# Patient Record
Sex: FEMALE | Race: WHITE | NOT HISPANIC OR LATINO | Employment: UNEMPLOYED | URBAN - METROPOLITAN AREA
[De-identification: names, ages, dates, MRNs, and addresses within clinical notes are randomized per-mention and may not be internally consistent; named-entity substitution may affect disease eponyms.]

---

## 2023-05-25 ENCOUNTER — OFFICE VISIT (OUTPATIENT)
Dept: PRIMARY CARE | Facility: CLINIC | Age: 23
End: 2023-05-25
Payer: COMMERCIAL

## 2023-05-25 ENCOUNTER — LAB (OUTPATIENT)
Dept: LAB | Facility: LAB | Age: 23
End: 2023-05-25
Payer: COMMERCIAL

## 2023-05-25 VITALS
BODY MASS INDEX: 21.12 KG/M2 | SYSTOLIC BLOOD PRESSURE: 123 MMHG | WEIGHT: 125 LBS | DIASTOLIC BLOOD PRESSURE: 86 MMHG | RESPIRATION RATE: 16 BRPM | TEMPERATURE: 98.2 F | HEART RATE: 82 BPM

## 2023-05-25 DIAGNOSIS — D50.9 IRON DEFICIENCY ANEMIA, UNSPECIFIED IRON DEFICIENCY ANEMIA TYPE: ICD-10-CM

## 2023-05-25 DIAGNOSIS — R53.83 OTHER FATIGUE: ICD-10-CM

## 2023-05-25 DIAGNOSIS — Z13.220 LIPID SCREENING: ICD-10-CM

## 2023-05-25 DIAGNOSIS — D50.9 IRON DEFICIENCY ANEMIA, UNSPECIFIED IRON DEFICIENCY ANEMIA TYPE: Primary | ICD-10-CM

## 2023-05-25 DIAGNOSIS — E55.9 VITAMIN D DEFICIENCY: ICD-10-CM

## 2023-05-25 DIAGNOSIS — Z30.09 BIRTH CONTROL COUNSELING: ICD-10-CM

## 2023-05-25 LAB
ALANINE AMINOTRANSFERASE (SGPT) (U/L) IN SER/PLAS: 12 U/L (ref 7–45)
ALBUMIN (G/DL) IN SER/PLAS: 4.1 G/DL (ref 3.4–5)
ALKALINE PHOSPHATASE (U/L) IN SER/PLAS: 68 U/L (ref 33–110)
ANION GAP IN SER/PLAS: 12 MMOL/L (ref 10–20)
ASPARTATE AMINOTRANSFERASE (SGOT) (U/L) IN SER/PLAS: 18 U/L (ref 9–39)
BASOPHILS (10*3/UL) IN BLOOD BY AUTOMATED COUNT: 0.04 X10E9/L (ref 0–0.1)
BASOPHILS/100 LEUKOCYTES IN BLOOD BY AUTOMATED COUNT: 0.7 % (ref 0–2)
BILIRUBIN TOTAL (MG/DL) IN SER/PLAS: 0.4 MG/DL (ref 0–1.2)
CALCIDIOL (25 OH VITAMIN D3) (NG/ML) IN SER/PLAS: 29 NG/ML
CALCIUM (MG/DL) IN SER/PLAS: 9.4 MG/DL (ref 8.6–10.3)
CARBON DIOXIDE, TOTAL (MMOL/L) IN SER/PLAS: 25 MMOL/L (ref 21–32)
CHLORIDE (MMOL/L) IN SER/PLAS: 103 MMOL/L (ref 98–107)
CHOLESTEROL (MG/DL) IN SER/PLAS: 233 MG/DL (ref 0–199)
CHOLESTEROL IN HDL (MG/DL) IN SER/PLAS: 77.5 MG/DL
CHOLESTEROL/HDL RATIO: 3
COBALAMIN (VITAMIN B12) (PG/ML) IN SER/PLAS: 211 PG/ML (ref 211–911)
CREATININE (MG/DL) IN SER/PLAS: 0.66 MG/DL (ref 0.5–1.05)
EOSINOPHILS (10*3/UL) IN BLOOD BY AUTOMATED COUNT: 0.07 X10E9/L (ref 0–0.7)
EOSINOPHILS/100 LEUKOCYTES IN BLOOD BY AUTOMATED COUNT: 1.2 % (ref 0–6)
ERYTHROCYTE DISTRIBUTION WIDTH (RATIO) BY AUTOMATED COUNT: 12.8 % (ref 11.5–14.5)
ERYTHROCYTE MEAN CORPUSCULAR HEMOGLOBIN CONCENTRATION (G/DL) BY AUTOMATED: 31.6 G/DL (ref 32–36)
ERYTHROCYTE MEAN CORPUSCULAR VOLUME (FL) BY AUTOMATED COUNT: 92 FL (ref 80–100)
ERYTHROCYTES (10*6/UL) IN BLOOD BY AUTOMATED COUNT: 4.48 X10E12/L (ref 4–5.2)
FOLATE (NG/ML) IN SER/PLAS: 11.5 NG/ML
GFR FEMALE: >90 ML/MIN/1.73M2
GLUCOSE (MG/DL) IN SER/PLAS: 107 MG/DL (ref 74–99)
HEMATOCRIT (%) IN BLOOD BY AUTOMATED COUNT: 41.2 % (ref 36–46)
HEMOGLOBIN (G/DL) IN BLOOD: 13 G/DL (ref 12–16)
HEMOGLOBIN (PG) IN RETICULOCYTES: 34 PG (ref 28–38)
IMMATURE GRANULOCYTES/100 LEUKOCYTES IN BLOOD BY AUTOMATED COUNT: 0.5 % (ref 0–0.9)
IRON (UG/DL) IN SER/PLAS: 95 UG/DL (ref 35–150)
IRON BINDING CAPACITY (UG/DL) IN SER/PLAS: 416 UG/DL (ref 240–445)
IRON SATURATION (%) IN SER/PLAS: 23 % (ref 25–45)
LDL: 125 MG/DL (ref 0–119)
LEUKOCYTES (10*3/UL) IN BLOOD BY AUTOMATED COUNT: 5.9 X10E9/L (ref 4.4–11.3)
LYMPHOCYTES (10*3/UL) IN BLOOD BY AUTOMATED COUNT: 1.36 X10E9/L (ref 1.2–4.8)
LYMPHOCYTES/100 LEUKOCYTES IN BLOOD BY AUTOMATED COUNT: 23.2 % (ref 13–44)
MONOCYTES (10*3/UL) IN BLOOD BY AUTOMATED COUNT: 0.42 X10E9/L (ref 0.1–1)
MONOCYTES/100 LEUKOCYTES IN BLOOD BY AUTOMATED COUNT: 7.2 % (ref 2–10)
NEUTROPHILS (10*3/UL) IN BLOOD BY AUTOMATED COUNT: 3.94 X10E9/L (ref 1.2–7.7)
NEUTROPHILS/100 LEUKOCYTES IN BLOOD BY AUTOMATED COUNT: 67.2 % (ref 40–80)
NON HDL CHOLESTEROL: 156 MG/DL (ref 0–149)
PLATELETS (10*3/UL) IN BLOOD AUTOMATED COUNT: 350 X10E9/L (ref 150–450)
POTASSIUM (MMOL/L) IN SER/PLAS: 4.4 MMOL/L (ref 3.5–5.3)
PROTEIN TOTAL: 7.4 G/DL (ref 6.4–8.2)
RETICULOCYTES (10*3/UL) IN BLOOD: 0.06 X10E12/L (ref 0.02–0.08)
RETICULOCYTES/100 ERYTHROCYTES IN BLOOD BY AUTOMATED COUNT: 1.3 % (ref 0.5–2)
SODIUM (MMOL/L) IN SER/PLAS: 136 MMOL/L (ref 136–145)
THYROTROPIN (MIU/L) IN SER/PLAS BY DETECTION LIMIT <= 0.05 MIU/L: 1.22 MIU/L (ref 0.44–3.98)
TRIGLYCERIDE (MG/DL) IN SER/PLAS: 152 MG/DL (ref 0–149)
UREA NITROGEN (MG/DL) IN SER/PLAS: 8 MG/DL (ref 6–23)
VLDL: 30 MG/DL (ref 0–40)

## 2023-05-25 PROCEDURE — 36415 COLL VENOUS BLD VENIPUNCTURE: CPT

## 2023-05-25 PROCEDURE — 84443 ASSAY THYROID STIM HORMONE: CPT

## 2023-05-25 PROCEDURE — 85025 COMPLETE CBC W/AUTO DIFF WBC: CPT

## 2023-05-25 PROCEDURE — 82607 VITAMIN B-12: CPT

## 2023-05-25 PROCEDURE — 80061 LIPID PANEL: CPT

## 2023-05-25 PROCEDURE — 80053 COMPREHEN METABOLIC PANEL: CPT

## 2023-05-25 PROCEDURE — 85045 AUTOMATED RETICULOCYTE COUNT: CPT

## 2023-05-25 PROCEDURE — 82306 VITAMIN D 25 HYDROXY: CPT

## 2023-05-25 PROCEDURE — 83550 IRON BINDING TEST: CPT

## 2023-05-25 PROCEDURE — 82746 ASSAY OF FOLIC ACID SERUM: CPT

## 2023-05-25 PROCEDURE — 83540 ASSAY OF IRON: CPT

## 2023-05-25 PROCEDURE — 99203 OFFICE O/P NEW LOW 30 MIN: CPT | Performed by: NURSE PRACTITIONER

## 2023-05-25 PROCEDURE — 82728 ASSAY OF FERRITIN: CPT

## 2023-05-25 RX ORDER — LEVONORGESTREL/ETHIN.ESTRADIOL 0.1-0.02MG
1 TABLET ORAL DAILY
COMMUNITY
Start: 2022-03-29 | End: 2023-12-12 | Stop reason: SDUPTHER

## 2023-05-25 NOTE — PROGRESS NOTES
Subjective   Patient ID: Soraya Quezada is a 22 y.o. female who presents for Iron Deficiency.    Pt here to establish care and discuss iron deficiency     She just graduated from  in middle childhood education (4th-9th grade math and science). Previously saw Dr. Espino for pediatrics. Eats chicken and turkey in her diet, fruits and vegetables. She was on a womens multivitamin in the past and still feels tired all the time. Sometimes feels unrefreshed after sleeping 8+ hours. She was napping daily.    She denies chest pain, shortness of breath, headaches,       Review of Systems   Constitutional:  Positive for fatigue. Negative for chills and fever.   HENT:  Negative for congestion, ear pain, rhinorrhea, sinus pressure and sore throat.    Eyes:  Negative for pain, discharge and itching.   Respiratory:  Negative for cough, shortness of breath and wheezing.    Cardiovascular:  Negative for chest pain and palpitations.   Gastrointestinal:  Negative for constipation, diarrhea, nausea and vomiting.   Genitourinary:  Negative for difficulty urinating and dysuria.   Musculoskeletal:  Negative for back pain, joint swelling and myalgias.   Skin:  Negative for color change.   Neurological:  Negative for headaches.   Hematological:  Negative for adenopathy.   Psychiatric/Behavioral:  Negative for decreased concentration. The patient is not nervous/anxious.        Objective   /86   Pulse 82   Temp 36.8 °C (98.2 °F)   Resp 16   Wt 56.7 kg (125 lb)   BMI 21.12 kg/m²     Physical Exam  Constitutional:       General: She is not in acute distress.     Appearance: She is not ill-appearing.   HENT:      Head: Normocephalic and atraumatic.      Mouth/Throat:      Mouth: Mucous membranes are moist.      Pharynx: Oropharynx is clear.   Eyes:      Conjunctiva/sclera: Conjunctivae normal.      Pupils: Pupils are equal, round, and reactive to light.   Cardiovascular:      Rate and Rhythm: Normal rate and regular rhythm.       Pulses: Normal pulses.      Heart sounds: Normal heart sounds.   Pulmonary:      Effort: Pulmonary effort is normal. No respiratory distress.      Breath sounds: Normal breath sounds.   Abdominal:      General: Bowel sounds are normal.      Palpations: Abdomen is soft.      Tenderness: There is no abdominal tenderness.   Musculoskeletal:         General: Normal range of motion.   Skin:     General: Skin is warm and dry.   Neurological:      General: No focal deficit present.      Mental Status: She is alert and oriented to person, place, and time.   Psychiatric:         Mood and Affect: Mood normal.         Behavior: Behavior normal.         Thought Content: Thought content normal.         Judgment: Judgment normal.         Assessment/Plan   Problem List Items Addressed This Visit    None  Visit Diagnoses       Iron deficiency anemia, unspecified iron deficiency anemia type    -  Primary    Relevant Orders    Folate (Completed)    Reticulocytes (Completed)    Ferritin (Completed)    Iron and TIBC (Completed)    CBC and Auto Differential (Completed)    Vitamin B12 (Completed)    Birth control counseling        Lipid screening        Relevant Orders    Lipid Panel (Completed)    Vitamin D deficiency        Relevant Orders    Vitamin D, Total (Completed)    Other fatigue        Relevant Orders    TSH with reflex to Free T4 if abnormal (Completed)    CBC and Auto Differential    Comprehensive Metabolic Panel (Completed)          Patient Instructions   Patient to continue meds as ordered. Have fasting labs drawn and we will call with results when available. Follow-up in 3 months for physical, or sooner if needed. Call the office if any problems or concerns in the meantime.

## 2023-05-26 LAB — FERRITIN (UG/LL) IN SER/PLAS: 37 UG/L (ref 8–150)

## 2023-06-07 RX ORDER — ERGOCALCIFEROL 1.25 MG/1
50000 CAPSULE ORAL
Qty: 12 CAPSULE | Refills: 0 | Status: SHIPPED | OUTPATIENT
Start: 2023-06-07 | End: 2023-08-30

## 2023-06-21 ASSESSMENT — ENCOUNTER SYMPTOMS
PALPITATIONS: 0
NERVOUS/ANXIOUS: 0
JOINT SWELLING: 0
HEADACHES: 0
RHINORRHEA: 0
SORE THROAT: 0
DIFFICULTY URINATING: 0
COLOR CHANGE: 0
NAUSEA: 0
SHORTNESS OF BREATH: 0
CHILLS: 0
SINUS PRESSURE: 0
DECREASED CONCENTRATION: 0
DYSURIA: 0
VOMITING: 0
MYALGIAS: 0
WHEEZING: 0
EYE PAIN: 0
FEVER: 0
EYE ITCHING: 0
DIARRHEA: 0
EYE DISCHARGE: 0
ADENOPATHY: 0
COUGH: 0
FATIGUE: 1
CONSTIPATION: 0
BACK PAIN: 0

## 2023-06-21 NOTE — PATIENT INSTRUCTIONS
Patient to continue meds as ordered. Have fasting labs drawn and we will call with results when available. Follow-up in 3 months for physical, or sooner if needed. Call the office if any problems or concerns in the meantime.

## 2023-09-14 ENCOUNTER — LAB (OUTPATIENT)
Dept: LAB | Facility: LAB | Age: 23
End: 2023-09-14
Payer: COMMERCIAL

## 2023-09-14 DIAGNOSIS — Z13.220 LIPID SCREENING: ICD-10-CM

## 2023-09-14 DIAGNOSIS — E55.9 VITAMIN D DEFICIENCY: ICD-10-CM

## 2023-09-14 LAB
CALCIDIOL (25 OH VITAMIN D3) (NG/ML) IN SER/PLAS: 47 NG/ML
CHOLESTEROL (MG/DL) IN SER/PLAS: 198 MG/DL (ref 0–199)
CHOLESTEROL IN HDL (MG/DL) IN SER/PLAS: 65.4 MG/DL
CHOLESTEROL/HDL RATIO: 3
LDL: 114 MG/DL (ref 0–119)
NON HDL CHOLESTEROL: 133 MG/DL (ref 0–149)
TRIGLYCERIDE (MG/DL) IN SER/PLAS: 94 MG/DL (ref 0–149)
VLDL: 19 MG/DL (ref 0–40)

## 2023-09-14 PROCEDURE — 36415 COLL VENOUS BLD VENIPUNCTURE: CPT

## 2023-09-14 PROCEDURE — 82306 VITAMIN D 25 HYDROXY: CPT

## 2023-09-14 PROCEDURE — 80061 LIPID PANEL: CPT

## 2023-12-12 ENCOUNTER — OFFICE VISIT (OUTPATIENT)
Dept: OBSTETRICS AND GYNECOLOGY | Facility: CLINIC | Age: 23
End: 2023-12-12
Payer: COMMERCIAL

## 2023-12-12 VITALS
HEIGHT: 65 IN | DIASTOLIC BLOOD PRESSURE: 74 MMHG | SYSTOLIC BLOOD PRESSURE: 118 MMHG | BODY MASS INDEX: 22.49 KG/M2 | WEIGHT: 135 LBS

## 2023-12-12 DIAGNOSIS — Z12.4 PAP SMEAR FOR CERVICAL CANCER SCREENING: ICD-10-CM

## 2023-12-12 DIAGNOSIS — Z11.3 ROUTINE SCREENING FOR STI (SEXUALLY TRANSMITTED INFECTION): ICD-10-CM

## 2023-12-12 DIAGNOSIS — Z30.41 ENCOUNTER FOR SURVEILLANCE OF CONTRACEPTIVE PILLS: Primary | ICD-10-CM

## 2023-12-12 PROBLEM — Z01.419 WELL WOMAN EXAM: Status: ACTIVE | Noted: 2023-12-12

## 2023-12-12 PROCEDURE — 88141 CYTOPATH C/V INTERPRET: CPT | Performed by: STUDENT IN AN ORGANIZED HEALTH CARE EDUCATION/TRAINING PROGRAM

## 2023-12-12 PROCEDURE — 87661 TRICHOMONAS VAGINALIS AMPLIF: CPT

## 2023-12-12 PROCEDURE — 88175 CYTOPATH C/V AUTO FLUID REDO: CPT

## 2023-12-12 PROCEDURE — 87800 DETECT AGNT MULT DNA DIREC: CPT

## 2023-12-12 PROCEDURE — 99395 PREV VISIT EST AGE 18-39: CPT | Performed by: ADVANCED PRACTICE MIDWIFE

## 2023-12-12 RX ORDER — LEVONORGESTREL/ETHIN.ESTRADIOL 0.1-0.02MG
1 TABLET ORAL DAILY
Qty: 90 TABLET | Refills: 3 | Status: SHIPPED | OUTPATIENT
Start: 2023-12-12 | End: 2024-01-24 | Stop reason: WASHOUT

## 2023-12-12 NOTE — PROGRESS NOTES
"Assessment/Plan   Problem List Items Addressed This Visit    None  Visit Diagnoses         Codes    Encounter for surveillance of contraceptive pills    -  Primary Z30.41    Relevant Medications    levonorgestreL-ethinyl estrad (Larissia) 0.1-20 mg-mcg tablet    Pap smear for cervical cancer screening     Z12.4    Relevant Orders    THINPREP PAP    Routine screening for STI (sexually transmitted infection)     Z11.3    Relevant Orders    THINPREP PAP            Yojana MARIANELA Coelho, ORLY-FAUSTINO     Subjective   Soraya Quezada is a 23 y.o. female who is here for a routine exam.     Due for first pap     Received HPV vaccination     Lives with: parents and brother   Current employment:  @ [employer]   T/E/D: never/rare ETOH/never   Up to date vision/dental: yes  PCP: yes  Menstrual history: OCPs, bleeds q 3 months   Sexual history: new partner since August  Yes condom use   Pregnancy history:  G/P 0  T: P:  EAB:   Ectopic:   SAB:   L:      Diet: yes   Exercise: sometimes    PMH, PSH, and Family hx reviewed and updated    Current contraception: OCP (estrogen/progesterone)  Refill Y/N:    Last pap: never  History of abnormal Pap smear: no  Family history of breast, uterine,  ovarian cancer: no  Regular self breast exam: not applicable  Last mammogram: n/a  History of abnormal mammogram: no          Review of Systems    Objective   /74   Ht 1.638 m (5' 4.5\")   Wt 61.2 kg (135 lb)   LMP 11/07/2023   BMI 22.81 kg/m²     Physical Exam  Constitutional:       Appearance: Normal appearance.   HENT:      Head: Normocephalic.   Neck:      Thyroid: No thyroid mass, thyromegaly or thyroid tenderness.   Cardiovascular:      Rate and Rhythm: Normal rate and regular rhythm.   Pulmonary:      Effort: Pulmonary effort is normal.      Breath sounds: Normal breath sounds.   Chest:   Breasts:     Right: Normal. No mass, nipple discharge or tenderness.      Left: Normal. No mass, nipple discharge or tenderness. "   Abdominal:      Palpations: Abdomen is soft.   Genitourinary:     Vagina: Normal.      Cervix: Normal.   Musculoskeletal:         General: Normal range of motion.   Lymphadenopathy:      Upper Body:      Right upper body: No axillary adenopathy.      Left upper body: No axillary adenopathy.   Skin:     General: Skin is warm and dry.   Neurological:      Mental Status: She is alert and oriented to person, place, and time.   Psychiatric:         Mood and Affect: Mood normal.          +Piecemeal deglutition

## 2023-12-14 LAB
C TRACH RRNA SPEC QL NAA+PROBE: NEGATIVE
N GONORRHOEA DNA SPEC QL PROBE+SIG AMP: NEGATIVE
T VAGINALIS RRNA SPEC QL NAA+PROBE: NEGATIVE

## 2024-01-03 LAB
CYTOLOGY CMNT CVX/VAG CYTO-IMP: NORMAL
LAB AP HPV HR: NORMAL
LAB AP PAP ADDITIONAL TESTS: NORMAL
LABORATORY COMMENT REPORT: NORMAL
LMP START DATE: NORMAL
PATH REPORT.TOTAL CANCER: NORMAL

## 2024-01-24 ENCOUNTER — OFFICE VISIT (OUTPATIENT)
Dept: OBSTETRICS AND GYNECOLOGY | Facility: CLINIC | Age: 24
End: 2024-01-24
Payer: COMMERCIAL

## 2024-01-24 ENCOUNTER — LAB (OUTPATIENT)
Dept: LAB | Facility: LAB | Age: 24
End: 2024-01-24
Payer: COMMERCIAL

## 2024-01-24 ENCOUNTER — TELEPHONE (OUTPATIENT)
Dept: OBSTETRICS AND GYNECOLOGY | Facility: CLINIC | Age: 24
End: 2024-01-24

## 2024-01-24 VITALS
HEIGHT: 64 IN | BODY MASS INDEX: 23.05 KG/M2 | DIASTOLIC BLOOD PRESSURE: 70 MMHG | WEIGHT: 135 LBS | SYSTOLIC BLOOD PRESSURE: 128 MMHG

## 2024-01-24 DIAGNOSIS — N93.9 ABNORMAL UTERINE BLEEDING: ICD-10-CM

## 2024-01-24 DIAGNOSIS — N93.9 ABNORMAL UTERINE BLEEDING: Primary | ICD-10-CM

## 2024-01-24 DIAGNOSIS — N92.1 BREAKTHROUGH BLEEDING ON OCPS: ICD-10-CM

## 2024-01-24 LAB
ERYTHROCYTE [DISTWIDTH] IN BLOOD BY AUTOMATED COUNT: 18.5 % (ref 11.5–14.5)
FERRITIN SERPL-MCNC: 9 NG/ML (ref 8–150)
HCT VFR BLD AUTO: 31.1 % (ref 36–46)
HGB BLD-MCNC: 9.1 G/DL (ref 12–16)
MCH RBC QN AUTO: 21.8 PG (ref 26–34)
MCHC RBC AUTO-ENTMCNC: 29.3 G/DL (ref 32–36)
MCV RBC AUTO: 74 FL (ref 80–100)
NRBC BLD-RTO: 0 /100 WBCS (ref 0–0)
PLATELET # BLD AUTO: 315 X10*3/UL (ref 150–450)
PREGNANCY TEST URINE, POC: NEGATIVE
RBC # BLD AUTO: 4.18 X10*6/UL (ref 4–5.2)
WBC # BLD AUTO: 5.6 X10*3/UL (ref 4.4–11.3)

## 2024-01-24 PROCEDURE — 81025 URINE PREGNANCY TEST: CPT | Performed by: ADVANCED PRACTICE MIDWIFE

## 2024-01-24 PROCEDURE — 99213 OFFICE O/P EST LOW 20 MIN: CPT | Performed by: ADVANCED PRACTICE MIDWIFE

## 2024-01-24 PROCEDURE — 82728 ASSAY OF FERRITIN: CPT

## 2024-01-24 PROCEDURE — 85027 COMPLETE CBC AUTOMATED: CPT

## 2024-01-24 PROCEDURE — 36415 COLL VENOUS BLD VENIPUNCTURE: CPT

## 2024-01-24 RX ORDER — DESOGESTREL AND ETHINYL ESTRADIOL 0.15-0.03
1 KIT ORAL DAILY
COMMUNITY
End: 2024-03-04

## 2024-01-24 RX ORDER — FERROUS SULFATE 325(65) MG
65 TABLET, DELAYED RELEASE (ENTERIC COATED) ORAL
COMMUNITY

## 2024-01-24 RX ORDER — NORGESTIMATE AND ETHINYL ESTRADIOL 0.25-0.035
1 KIT ORAL DAILY
Qty: 28 TABLET | Refills: 11 | Status: SHIPPED | OUTPATIENT
Start: 2024-01-24 | End: 2025-01-23

## 2024-01-24 ASSESSMENT — PAIN SCALES - GENERAL: PAINLEVEL: 0-NO PAIN

## 2024-01-24 NOTE — PROGRESS NOTES
Subjective   Patient ID: Soraya Quezada is a 23 y.o. female who presents for Vaginal Bleeding.  Vaginal Bleeding        Pt. Here to discuss AUB on OCPs    Has been continuously cycling and has a bleed q 3 months    Stopped OCP end of December x 7 days  Restarted active pills first week of January    Then started to have spotting about 4-5 days into active pill   And that continued to a lighter flow/regular flow     Stopped active pills again this past Monday and bleeding has been heavier this week   Yesterday bled through a tampon and a pad within the hour   Bleeding is heavy today   Worried about her iron levels    Would like to stay on OCP     Review of Systems   Genitourinary:  Positive for vaginal bleeding.       Objective   Physical Exam  Constitutional:       Appearance: Normal appearance.   Neurological:      Mental Status: She is alert.   Psychiatric:         Mood and Affect: Mood normal.         Behavior: Behavior normal.         Thought Content: Thought content normal.         Judgment: Judgment normal.         Assessment/Plan   Problem List Items Addressed This Visit             ICD-10-CM    Abnormal uterine bleeding - Primary N93.9    Relevant Medications    norgestimate-ethinyl estradioL (Ortho-Cyclen) 0.25-35 mg-mcg tablet    Other Relevant Orders    CBC    Ferritin    POCT pregnancy, urine (Completed)    Breakthrough bleeding on OCPs N92.1     RX sent for Ortho Cyclen. Restart active pills. If bleeding is not improved within the week to call office.                  TONI Mejía 01/24/24 11:05 AM

## 2024-01-24 NOTE — ASSESSMENT & PLAN NOTE
RX sent for Ortho Cyclen. Restart active pills. If bleeding is not improved within the week to call office.

## 2024-01-24 NOTE — TELEPHONE ENCOUNTER
Pt was seen 12/12/23 for LENO. She takes OCPs continuously and gets a menses every 3 months. This month, she took placebos as scheduled and had a period 12/28-1/4/24. She said that she stopped bleeding for 4-5d then restarted. She continued her pills for a few days but them stopped them since she was still bleeding/spotting. Pt then restarted pills and has cont to have bleeding, heavy at times saturating a super tampon and a pad multiple times a day for the last few days. She said that she has called off work due to it. Advised pt that stopping/ re-starting the pills off schedule and mid pack throws off the hormonal balance and will cause this AUB. Denies chance of pregnancy. She was advised to continue OCP's daily, keep track of sx and follow up in office to  further discuss with a provider. Transferred to the  to schedule.    states that call was disconnected during transfer and pt did not answer on call back. FD will try pt back one more time.

## 2024-01-26 ENCOUNTER — TELEPHONE (OUTPATIENT)
Dept: OBSTETRICS AND GYNECOLOGY | Facility: CLINIC | Age: 24
End: 2024-01-26
Payer: COMMERCIAL

## 2024-01-26 NOTE — TELEPHONE ENCOUNTER
Attempted to call pt to further discuss her sx and results.  Male answered.   Was told pt not available at the moment. Asked him to have pt call the office when can.

## 2024-01-26 NOTE — TELEPHONE ENCOUNTER
----- Message from Yojana Coelho APRN-CNM sent at 1/26/2024  8:38 AM EST -----  Can we call to check today? I sent a my chart message yesterday about her anemia, and how to take the pills if she was still bleeding.

## 2024-02-05 ENCOUNTER — TELEPHONE (OUTPATIENT)
Dept: OBSTETRICS AND GYNECOLOGY | Facility: CLINIC | Age: 24
End: 2024-02-05
Payer: COMMERCIAL

## 2024-02-05 NOTE — TELEPHONE ENCOUNTER
"Pt was seen 1/24/24. She was given an Rx for OCP's to take traditionally. Per chart notes, pt CBC came back low on 1/25/24 and she was instructed by provider to start pills with a tapered dose.  3 Pills day 1, 2 pills day two, then one pill daily. Rx was not updated to reflect those directions. Pharmacy states, it is to late to update the Rx. Pharmacist said, out of pocket cost for pt will be $73 or she can contact her insurance to see if they will do a one time override for the extra pill pack that is needed to keep pt on track.     2/6/24 1000 Nurse called pt ins. And was advised to contact Petra @ [phone]. Call had a long wait time and connection was lost after 19min. Will try back at a later time.   2/6/24 1430 Called petra back. Spoke to per who states pt ID# is [id number]. They transferred call to Simon, members line, advised to select \"member\" then inform rep  Office is calling.  Rep stated that override could be placed once pharmacy runs the order. (Call ref#[id number]) Office called pt [facility] pharmacy [phone] and spoke to pharmacist. She states that order has been run and sent to ins for override. Pt was informed and will contact pharmacy to  pill pack.   "

## 2024-03-03 DIAGNOSIS — Z30.9 ENCOUNTER FOR CONTRACEPTIVE MANAGEMENT, UNSPECIFIED: ICD-10-CM

## 2024-03-03 DIAGNOSIS — N93.9 ABNORMAL UTERINE BLEEDING (AUB): Primary | ICD-10-CM

## 2024-03-04 RX ORDER — DESOGESTREL AND ETHINYL ESTRADIOL 0.15-0.03
KIT ORAL
Qty: 112 TABLET | Refills: 2 | Status: SHIPPED | OUTPATIENT
Start: 2024-03-04 | End: 2024-04-17 | Stop reason: WASHOUT

## 2024-04-17 ENCOUNTER — TELEMEDICINE (OUTPATIENT)
Dept: OBSTETRICS AND GYNECOLOGY | Facility: CLINIC | Age: 24
End: 2024-04-17
Payer: COMMERCIAL

## 2024-04-17 DIAGNOSIS — N92.1 BREAKTHROUGH BLEEDING ON OCPS: Primary | ICD-10-CM

## 2024-04-17 PROCEDURE — 99212 OFFICE O/P EST SF 10 MIN: CPT | Performed by: ADVANCED PRACTICE MIDWIFE

## 2024-04-17 NOTE — ASSESSMENT & PLAN NOTE
Discussed having withdrawal bleed on this current OCP to see if this regulates. If not, will refer for second opinion but pt. Is also considering Depo if no relief.   Will check back in a few months

## 2024-04-17 NOTE — PROGRESS NOTES
Subjective   Patient ID: Soraya Quezada is a 23 y.o. female who presents for No chief complaint on file..  HPI    Pt. To discuss AUB on OCPs     Started on OCPs in 2021  Was on Loloestrin x 4 months, then had BTB and switched to Apri    Did continuous cycling on Apri for 2-3 years and did well. Had episode of acute menstrual bleeding in January and became anemic. Went on OCP taper.   Switched to Ortho Cyclen. Trying to continuous cycle again. Was ok for about 1.5 months and then had spotting on active pills again.   Not heavy at all, but frustrating.          Review of Systems    Objective   Physical Exam    Assessment/Plan   Problem List Items Addressed This Visit             ICD-10-CM       Medium    Breakthrough bleeding on OCPs - Primary N92.1     Discussed having withdrawal bleed on this current OCP to see if this regulates. If not, will refer for second opinion but pt. Is also considering Depo if no relief.   Will check back in a few months                  TONI Mejía 04/17/24 3:00 PM

## 2024-09-26 DIAGNOSIS — N93.9 ABNORMAL UTERINE BLEEDING: ICD-10-CM

## 2024-09-26 RX ORDER — NORGESTIMATE AND ETHINYL ESTRADIOL 0.25-0.035
1 KIT ORAL DAILY
Qty: 84 TABLET | Refills: 3 | Status: SHIPPED | OUTPATIENT
Start: 2024-09-26

## 2024-10-21 ENCOUNTER — APPOINTMENT (OUTPATIENT)
Dept: PRIMARY CARE | Facility: CLINIC | Age: 24
End: 2024-10-21
Payer: COMMERCIAL

## 2024-10-21 ENCOUNTER — LAB (OUTPATIENT)
Dept: LAB | Facility: LAB | Age: 24
End: 2024-10-21
Payer: COMMERCIAL

## 2024-10-21 VITALS
SYSTOLIC BLOOD PRESSURE: 98 MMHG | DIASTOLIC BLOOD PRESSURE: 68 MMHG | HEIGHT: 64 IN | BODY MASS INDEX: 21.85 KG/M2 | WEIGHT: 128 LBS | HEART RATE: 88 BPM

## 2024-10-21 DIAGNOSIS — Z00.00 ROUTINE GENERAL MEDICAL EXAMINATION AT A HEALTH CARE FACILITY: ICD-10-CM

## 2024-10-21 DIAGNOSIS — Z00.00 ROUTINE GENERAL MEDICAL EXAMINATION AT A HEALTH CARE FACILITY: Primary | ICD-10-CM

## 2024-10-21 LAB
ALBUMIN SERPL BCP-MCNC: 4.1 G/DL (ref 3.4–5)
ALP SERPL-CCNC: 71 U/L (ref 33–110)
ALT SERPL W P-5'-P-CCNC: 13 U/L (ref 7–45)
ANION GAP SERPL CALC-SCNC: 14 MMOL/L (ref 10–20)
AST SERPL W P-5'-P-CCNC: 16 U/L (ref 9–39)
BASOPHILS # BLD AUTO: 0.03 X10*3/UL (ref 0–0.1)
BASOPHILS NFR BLD AUTO: 0.5 %
BILIRUB SERPL-MCNC: 0.4 MG/DL (ref 0–1.2)
BUN SERPL-MCNC: 6 MG/DL (ref 6–23)
CALCIUM SERPL-MCNC: 9.6 MG/DL (ref 8.6–10.6)
CHLORIDE SERPL-SCNC: 104 MMOL/L (ref 98–107)
CHOLEST SERPL-MCNC: 200 MG/DL (ref 0–199)
CHOLESTEROL/HDL RATIO: 3.3
CO2 SERPL-SCNC: 25 MMOL/L (ref 21–32)
CREAT SERPL-MCNC: 0.83 MG/DL (ref 0.5–1.05)
EGFRCR SERPLBLD CKD-EPI 2021: >90 ML/MIN/1.73M*2
EOSINOPHIL # BLD AUTO: 0.05 X10*3/UL (ref 0–0.7)
EOSINOPHIL NFR BLD AUTO: 0.9 %
ERYTHROCYTE [DISTWIDTH] IN BLOOD BY AUTOMATED COUNT: 17.2 % (ref 11.5–14.5)
EST. AVERAGE GLUCOSE BLD GHB EST-MCNC: 117 MG/DL
GLUCOSE SERPL-MCNC: 94 MG/DL (ref 74–99)
HBA1C MFR BLD: 5.7 %
HCT VFR BLD AUTO: 34.9 % (ref 36–46)
HDLC SERPL-MCNC: 60.6 MG/DL
HGB BLD-MCNC: 10.1 G/DL (ref 12–16)
IMM GRANULOCYTES # BLD AUTO: 0.02 X10*3/UL (ref 0–0.7)
IMM GRANULOCYTES NFR BLD AUTO: 0.3 % (ref 0–0.9)
LDLC SERPL CALC-MCNC: 114 MG/DL
LYMPHOCYTES # BLD AUTO: 1.71 X10*3/UL (ref 1.2–4.8)
LYMPHOCYTES NFR BLD AUTO: 29.7 %
MCH RBC QN AUTO: 21.8 PG (ref 26–34)
MCHC RBC AUTO-ENTMCNC: 28.9 G/DL (ref 32–36)
MCV RBC AUTO: 75 FL (ref 80–100)
MONOCYTES # BLD AUTO: 0.42 X10*3/UL (ref 0.1–1)
MONOCYTES NFR BLD AUTO: 7.3 %
NEUTROPHILS # BLD AUTO: 3.53 X10*3/UL (ref 1.2–7.7)
NEUTROPHILS NFR BLD AUTO: 61.3 %
NON HDL CHOLESTEROL: 139 MG/DL (ref 0–149)
NRBC BLD-RTO: 0 /100 WBCS (ref 0–0)
PLATELET # BLD AUTO: 460 X10*3/UL (ref 150–450)
POTASSIUM SERPL-SCNC: 4.5 MMOL/L (ref 3.5–5.3)
PROT SERPL-MCNC: 7.5 G/DL (ref 6.4–8.2)
RBC # BLD AUTO: 4.64 X10*6/UL (ref 4–5.2)
SODIUM SERPL-SCNC: 138 MMOL/L (ref 136–145)
TRIGL SERPL-MCNC: 128 MG/DL (ref 0–149)
VLDL: 26 MG/DL (ref 0–40)
WBC # BLD AUTO: 5.8 X10*3/UL (ref 4.4–11.3)

## 2024-10-21 PROCEDURE — 3008F BODY MASS INDEX DOCD: CPT | Performed by: FAMILY MEDICINE

## 2024-10-21 PROCEDURE — 90471 IMMUNIZATION ADMIN: CPT | Performed by: FAMILY MEDICINE

## 2024-10-21 PROCEDURE — 90715 TDAP VACCINE 7 YRS/> IM: CPT | Performed by: FAMILY MEDICINE

## 2024-10-21 PROCEDURE — 36415 COLL VENOUS BLD VENIPUNCTURE: CPT

## 2024-10-21 PROCEDURE — 80061 LIPID PANEL: CPT

## 2024-10-21 PROCEDURE — 85025 COMPLETE CBC W/AUTO DIFF WBC: CPT

## 2024-10-21 PROCEDURE — 83036 HEMOGLOBIN GLYCOSYLATED A1C: CPT

## 2024-10-21 PROCEDURE — 1036F TOBACCO NON-USER: CPT | Performed by: FAMILY MEDICINE

## 2024-10-21 PROCEDURE — 80053 COMPREHEN METABOLIC PANEL: CPT

## 2024-10-21 PROCEDURE — 99395 PREV VISIT EST AGE 18-39: CPT | Performed by: FAMILY MEDICINE

## 2024-10-21 RX ORDER — BISMUTH SUBSALICYLATE 262 MG
1 TABLET,CHEWABLE ORAL DAILY
COMMUNITY

## 2024-10-21 ASSESSMENT — ENCOUNTER SYMPTOMS
FATIGUE: 0
POLYDIPSIA: 0
ACTIVITY CHANGE: 0
CONSTIPATION: 0
SHORTNESS OF BREATH: 0
DIARRHEA: 0
NERVOUS/ANXIOUS: 0
FEVER: 0
CHILLS: 0
HEADACHES: 0
DIZZINESS: 0
VOMITING: 0
SLEEP DISTURBANCE: 0
NAUSEA: 0
UNEXPECTED WEIGHT CHANGE: 0

## 2024-10-21 ASSESSMENT — PATIENT HEALTH QUESTIONNAIRE - PHQ9
1. LITTLE INTEREST OR PLEASURE IN DOING THINGS: NOT AT ALL
2. FEELING DOWN, DEPRESSED OR HOPELESS: NOT AT ALL
SUM OF ALL RESPONSES TO PHQ9 QUESTIONS 1 AND 2: 0

## 2024-10-21 NOTE — PROGRESS NOTES
"Subjective   Patient ID: Soraya Quezada is a 24 y.o. female who presents for Annual Exam.    Adult health Exam   - reports she generally is doing well  - does deal with some stress from her job, but it is generally well managed   - generally considers herself healthy, does eat well and exercises 5-6 times a week   - non-smoker, drinks alcohol occasionally (typically < 3 drinks/week) denies any other drug use   - No major personal health history   - reports she does not take significant caffeine does not drink coffee regularly   - Family history is not significant   - no problems with nausea, vomiting diarrhea or constipation   - no issues with chest pain shortness of breath or headaches   - no vision or hearing issues   - does follow with optometry and dentist regularly   - has a regular menstrual cycle and  does follow with an OBGYN for routine care   - no other health concerns        Review of Systems   Constitutional:  Negative for activity change, chills, fatigue, fever and unexpected weight change.   Eyes:  Negative for visual disturbance.   Respiratory:  Negative for shortness of breath.    Cardiovascular:  Negative for chest pain.   Gastrointestinal:  Negative for constipation, diarrhea, nausea and vomiting.   Endocrine: Negative for polydipsia and polyuria.   Neurological:  Negative for dizziness and headaches.   Psychiatric/Behavioral:  Negative for sleep disturbance. The patient is not nervous/anxious.        Objective   BP 98/68   Pulse 88   Ht 1.626 m (5' 4\")   Wt 58.1 kg (128 lb)   BMI 21.97 kg/m²     Physical Exam  Constitutional:       Appearance: Normal appearance.   HENT:      Right Ear: Tympanic membrane normal.      Left Ear: Tympanic membrane normal.      Nose: Nose normal.      Mouth/Throat:      Mouth: Mucous membranes are moist.      Pharynx: Oropharynx is clear.   Eyes:      Pupils: Pupils are equal, round, and reactive to light.   Cardiovascular:      Rate and Rhythm: Normal rate and " regular rhythm.   Pulmonary:      Effort: Pulmonary effort is normal. No respiratory distress.      Breath sounds: Normal breath sounds.   Abdominal:      General: Abdomen is flat. Bowel sounds are normal.   Musculoskeletal:         General: No swelling or tenderness. Normal range of motion.      Cervical back: Normal range of motion.   Skin:     General: Skin is warm.      Capillary Refill: Capillary refill takes less than 2 seconds.   Neurological:      General: No focal deficit present.      Mental Status: She is alert and oriented to person, place, and time. Mental status is at baseline.   Psychiatric:         Mood and Affect: Mood normal.         Thought Content: Thought content normal.       Assessment/Plan   Problem List Items Addressed This Visit    None  Visit Diagnoses         Codes    Routine general medical examination at a health care facility    -  Primary Z00.00    Relevant Orders    CBC and Auto Differential    Comprehensive metabolic panel    Lipid panel    Hemoglobin A1c    Tdap vaccine, age 7 years and older  (BOOSTRIX)

## 2025-02-11 NOTE — PROGRESS NOTES
24-year-old who complains of vaginal discharge and odor  She is sexually active but uses condoms in addition to her birth control pills  No fever no pelvic pain  No dysuria    Last gyn: 12/12/2023 w/ Drew-kiran  Pap: 12/12/2023 unremarkable cytology small  Mammogram: n/a  Colonoscopy: n/a  Contraception: BCP    CONSTITUTIONAL: Alert and in no acute distress. Well developed, well nourished.   HEAD AND FACE: Head and face: Normal.   EYES: Normal external exam - nonicteric sclera, extraocular movements intact (EOMI) and no ptosis.   EARS, NOSE, MOUTH, AND THROAT: External inspection of ears and nose: Normal.     GENITOURINARY: External genitalia: Normal. No inguinal lymphadenopathy. Bartholin's Urethral and Skenes Glands: Normal. Urethra: Normal. Bladder: Normal on palpation. Vagina: Normal. Cervix: Normal. Uterus: Normal. Right Adnexa/parametria: Normal. Left Adnexa/parametria: Normal. Inspection of Perianal Area: Normal.   MUSCULOSKELETAL: No joint swelling seen, normal movements of all extremities.   SKIN: Normal skin color and pigmentation, normal skin turgor, and no rash.   NEUROLOGIC: Non-focal. Grossly intact.        PSYCHIATRIC: Alert and oriented x 3. Affect normal to patient baseline. Mood: Appropriate.     Assessment/plan:   Pelvic exam unremarkable  Vaginal swab for BV and yeast, urine for GC and chlamydia  
52.25

## 2025-02-18 ENCOUNTER — APPOINTMENT (OUTPATIENT)
Dept: OBSTETRICS AND GYNECOLOGY | Facility: CLINIC | Age: 25
End: 2025-02-18
Payer: COMMERCIAL

## 2025-02-18 VITALS
HEIGHT: 64 IN | SYSTOLIC BLOOD PRESSURE: 128 MMHG | WEIGHT: 125 LBS | DIASTOLIC BLOOD PRESSURE: 62 MMHG | BODY MASS INDEX: 21.34 KG/M2

## 2025-02-18 DIAGNOSIS — N89.8 VAGINAL IRRITATION: ICD-10-CM

## 2025-02-18 DIAGNOSIS — Z11.3 SCREEN FOR STD (SEXUALLY TRANSMITTED DISEASE): ICD-10-CM

## 2025-02-18 DIAGNOSIS — N93.9 ABNORMAL UTERINE BLEEDING: ICD-10-CM

## 2025-02-18 PROCEDURE — 99214 OFFICE O/P EST MOD 30 MIN: CPT | Performed by: OBSTETRICS & GYNECOLOGY

## 2025-02-18 PROCEDURE — 3008F BODY MASS INDEX DOCD: CPT | Performed by: OBSTETRICS & GYNECOLOGY

## 2025-02-18 PROCEDURE — 1036F TOBACCO NON-USER: CPT | Performed by: OBSTETRICS & GYNECOLOGY

## 2025-02-18 RX ORDER — NORGESTIMATE AND ETHINYL ESTRADIOL 0.25-0.035
1 KIT ORAL DAILY
Qty: 84 TABLET | Refills: 3 | Status: SHIPPED | OUTPATIENT
Start: 2025-02-18

## 2025-02-18 ASSESSMENT — ENCOUNTER SYMPTOMS
DYSURIA: 0
HEMATURIA: 0
ABDOMINAL PAIN: 0
CONSTIPATION: 0
SORE THROAT: 0
FLANK PAIN: 0
DIARRHEA: 0
BACK PAIN: 0
HEADACHES: 0
ANOREXIA: 0
NAUSEA: 0
FREQUENCY: 0
CHILLS: 0
VOMITING: 0
FEVER: 0

## 2025-02-18 ASSESSMENT — PAIN SCALES - GENERAL: PAINLEVEL_OUTOF10: 0-NO PAIN

## 2025-02-19 LAB
BV SCORE VAG QL: ABNORMAL
C TRACH RRNA SPEC QL NAA+PROBE: NOT DETECTED
N GONORRHOEA RRNA SPEC QL NAA+PROBE: NOT DETECTED
QUEST GC CT AMPLIFIED (ALWAYS MESSAGE): NORMAL
T VAGINALIS RRNA SPEC QL NAA+PROBE: NOT DETECTED

## 2025-02-20 ENCOUNTER — TELEPHONE (OUTPATIENT)
Dept: OBSTETRICS AND GYNECOLOGY | Facility: CLINIC | Age: 25
End: 2025-02-20
Payer: COMMERCIAL

## 2025-02-20 DIAGNOSIS — N76.0 BACTERIAL VAGINOSIS: ICD-10-CM

## 2025-02-20 DIAGNOSIS — B96.89 BACTERIAL VAGINOSIS: ICD-10-CM

## 2025-02-20 RX ORDER — METRONIDAZOLE 500 MG/1
500 TABLET ORAL 2 TIMES DAILY
Qty: 14 TABLET | Refills: 0 | Status: SHIPPED | OUTPATIENT
Start: 2025-02-20 | End: 2025-02-27

## 2025-02-20 NOTE — TELEPHONE ENCOUNTER
Attempted to reach pt by phone.     Got voice mail.   Message left to call office.  RE:   +BV result.  Offer treatment options as dr rankin advised.

## 2025-02-20 NOTE — TELEPHONE ENCOUNTER
----- Message from Dora Cardenas sent at 2/20/2025  8:52 AM EST -----  Please call the patient regarding her abnormal result.  Offer treatment with flagyl or metroget

## 2025-02-20 NOTE — TELEPHONE ENCOUNTER
Pt returned call to office.    Pt informed of + BV result.   Pt prefers PO flagyl.  Order placed in system and sent to dr rankin to approve